# Patient Record
Sex: FEMALE
[De-identification: names, ages, dates, MRNs, and addresses within clinical notes are randomized per-mention and may not be internally consistent; named-entity substitution may affect disease eponyms.]

---

## 2018-01-26 ENCOUNTER — HOSPITAL ENCOUNTER (OUTPATIENT)
Dept: HOSPITAL 5 - CATHLABREC | Age: 73
Discharge: HOME | End: 2018-01-26
Attending: INTERNAL MEDICINE
Payer: MEDICARE

## 2018-01-26 VITALS — SYSTOLIC BLOOD PRESSURE: 101 MMHG | DIASTOLIC BLOOD PRESSURE: 50 MMHG

## 2018-01-26 DIAGNOSIS — E03.9: ICD-10-CM

## 2018-01-26 DIAGNOSIS — M19.90: ICD-10-CM

## 2018-01-26 DIAGNOSIS — K21.9: ICD-10-CM

## 2018-01-26 DIAGNOSIS — I27.20: Primary | ICD-10-CM

## 2018-01-26 DIAGNOSIS — Z79.01: ICD-10-CM

## 2018-01-26 DIAGNOSIS — E78.00: ICD-10-CM

## 2018-01-26 DIAGNOSIS — F41.9: ICD-10-CM

## 2018-01-26 DIAGNOSIS — I10: ICD-10-CM

## 2018-01-26 LAB
BASOPHILS # (AUTO): 0.1 K/MM3 (ref 0–0.1)
BASOPHILS NFR BLD AUTO: 1 % (ref 0–1.8)
BUN SERPL-MCNC: 21 MG/DL (ref 7–17)
BUN/CREAT SERPL: 19 %
CALCIUM SERPL-MCNC: 8.6 MG/DL (ref 8.4–10.2)
EOSINOPHIL # BLD AUTO: 0.2 K/MM3 (ref 0–0.4)
EOSINOPHIL NFR BLD AUTO: 3.1 % (ref 0–4.3)
HCT VFR BLD CALC: 39.9 % (ref 30.3–42.9)
HEMOLYSIS INDEX: 32
HGB BLD-MCNC: 13.2 GM/DL (ref 10.1–14.3)
INR PPP: 0.9 (ref 0.87–1.13)
LYMPHOCYTES # BLD AUTO: 2.8 K/MM3 (ref 1.2–5.4)
LYMPHOCYTES NFR BLD AUTO: 41.7 % (ref 13.4–35)
MCH RBC QN AUTO: 28 PG (ref 28–32)
MCHC RBC AUTO-ENTMCNC: 33 % (ref 30–34)
MCV RBC AUTO: 84 FL (ref 79–97)
MONOCYTES # (AUTO): 0.5 K/MM3 (ref 0–0.8)
MONOCYTES % (AUTO): 7.7 % (ref 0–7.3)
PLATELET # BLD: 179 K/MM3 (ref 140–440)
RBC # BLD AUTO: 4.75 M/MM3 (ref 3.65–5.03)

## 2018-01-26 PROCEDURE — C1894 INTRO/SHEATH, NON-LASER: HCPCS

## 2018-01-26 PROCEDURE — C1769 GUIDE WIRE: HCPCS

## 2018-01-26 PROCEDURE — 85025 COMPLETE CBC W/AUTO DIFF WBC: CPT

## 2018-01-26 PROCEDURE — 93010 ELECTROCARDIOGRAM REPORT: CPT

## 2018-01-26 PROCEDURE — 85610 PROTHROMBIN TIME: CPT

## 2018-01-26 PROCEDURE — 93451 RIGHT HEART CATH: CPT

## 2018-01-26 PROCEDURE — 80048 BASIC METABOLIC PNL TOTAL CA: CPT

## 2018-01-26 PROCEDURE — 36415 COLL VENOUS BLD VENIPUNCTURE: CPT

## 2018-01-26 PROCEDURE — 93005 ELECTROCARDIOGRAM TRACING: CPT

## 2018-01-26 PROCEDURE — 85730 THROMBOPLASTIN TIME PARTIAL: CPT

## 2018-01-26 NOTE — SHORT STAY SUMMARY
Short Stay Documentation


Date of service: 01/26/18





- History


H&P: obtained from office


Past Surgical History: No surgical history


Social history: no significant social history





- Allergies and Medications


Current Medications: 


 Allergies





No Known Allergies Allergy (Verified 05/15/15 07:32)


 





 Home Medications











 Medication  Instructions  Recorded  Confirmed  Last Taken  Type


 


Fenofibrate,Micronized 134 mg PO QHS 04/20/15 01/26/18 01/25/18 History





[Fenofibrate]     


 


Furosemide 40 mg PO DAILY 04/20/15 01/26/18 01/25/18 History


 


Hydroxyzine HCl [hydrOXYzine] 50 mg PO DAILY 04/20/15 01/26/18 01/25/18 History


 


Ranitidine HCl [Ranitidine 150mg 300 mg PO DAILY 04/20/15 01/26/18 01/25/18 

History





Cap]     


 


buPROPion  mg PO DAILY 04/20/15 01/26/18 01/25/18 History


 


clonazePAM 1 mg PO QHS 04/20/15 01/26/18 01/25/18 History


 


Lansoprazole (Nf) [Prevacid (Nf)] 30 mg PO DAILY 05/15/15 01/26/18 01/25/18 

History


 


AtorvaSTATin [Lipitor] 10 mg PO QHS 12/07/15 01/26/18 01/25/18 History


 


Clopidogrel Bisulfate [Plavix] 75 mg PO QHS 12/07/15 01/26/18 01/25/18 History


 


oxyCODONE /ACETAMINOPHEN [Percocet 1 - 2 tab PO Q6HR PRN #30 tablet 12/08/15 01/

26/18 4 Months Ago Rx





5/325 mg]    ~09/26/17 


 


Gabapentin [Neurontin] 300 mg PO DAILY 03/23/16 01/26/18 01/25/18 History


 


Latanoprost 0.005% [Xalatan 0.005%] 1 drop OP QPM 03/23/16 01/26/18 01/25/18 

History


 


Levothyroxine [Synthroid] 75 mcg PO QAM 03/23/16 01/26/18 01/25/18 History


 


Modafinil [Provigil] 200 mg PO QAM 03/23/16 01/26/18 01/25/18 History








Active Medications





Sodium Chloride (Nacl 0.9% 500 Ml)  500 mls @ 50 mls/hr IV AS DIRECT CHATO


   Stop: 01/26/18 17:59


   Last Admin: 01/26/18 08:03 Dose:  50 mls/hr











- Physical exam


General appearance: no acute distress


Integumentary: no rash


HEENT: Atraumatic


Lungs: Clear to auscultation


Breasts: deferred


Heart: Regular rate


Gastrointestinal: normal


Female Genitourinary: deferred


Rectal Exam: deferred


Extremities: no ischemia


Neurological: Normal gait





- Brief post op/procedure progress note


Date of procedure: 01/26/18


Pre-op diagnosis: Pulmonary Hypertension


Post-op diagnosis: same


Procedure: 





RHC


Anesthesia: MAC


Findings: 





See report


Surgeon: SCHUYLER TOMAS


Estimated blood loss: none


Pathology: none


Condition: stable





- Hospital course


Hospital course: 





Uneventful





- Disposition


Condition at discharge: Good


Disposition: DC-01 TO HOME OR SELFCARE





Short Stay Discharge Plan


Activity: advance as tolerated


Weight Bearing Status: Weight Bear as Tolerated


Diet: low fat, low cholesterol, low salt


Follow up with: 


JULITO BAKER MD [Primary Care Provider] - 7 Days